# Patient Record
Sex: FEMALE | Race: OTHER | HISPANIC OR LATINO | URBAN - METROPOLITAN AREA
[De-identification: names, ages, dates, MRNs, and addresses within clinical notes are randomized per-mention and may not be internally consistent; named-entity substitution may affect disease eponyms.]

---

## 2019-01-15 ENCOUNTER — EMERGENCY (EMERGENCY)
Facility: HOSPITAL | Age: 69
LOS: 1 days | Discharge: HOME | End: 2019-01-15
Attending: STUDENT IN AN ORGANIZED HEALTH CARE EDUCATION/TRAINING PROGRAM
Payer: MEDICARE

## 2019-01-15 VITALS
SYSTOLIC BLOOD PRESSURE: 152 MMHG | DIASTOLIC BLOOD PRESSURE: 88 MMHG | OXYGEN SATURATION: 98 % | HEART RATE: 96 BPM | TEMPERATURE: 99 F | RESPIRATION RATE: 20 BRPM | HEIGHT: 63 IN | WEIGHT: 154.1 LBS

## 2019-01-15 LAB
ALBUMIN SERPL ELPH-MCNC: 4.2 G/DL — SIGNIFICANT CHANGE UP (ref 3.3–5.2)
ALP SERPL-CCNC: 78 U/L — SIGNIFICANT CHANGE UP (ref 40–120)
ALT FLD-CCNC: 16 U/L — SIGNIFICANT CHANGE UP
ANION GAP SERPL CALC-SCNC: 10 MMOL/L — SIGNIFICANT CHANGE UP (ref 5–17)
AST SERPL-CCNC: 20 U/L — SIGNIFICANT CHANGE UP
BASOPHILS # BLD AUTO: 0 K/UL — SIGNIFICANT CHANGE UP (ref 0–0.2)
BASOPHILS NFR BLD AUTO: 0.4 % — SIGNIFICANT CHANGE UP (ref 0–2)
BILIRUB SERPL-MCNC: <0.2 MG/DL — LOW (ref 0.4–2)
BUN SERPL-MCNC: 13 MG/DL — SIGNIFICANT CHANGE UP (ref 8–20)
CALCIUM SERPL-MCNC: 9.2 MG/DL — SIGNIFICANT CHANGE UP (ref 8.6–10.2)
CHLORIDE SERPL-SCNC: 104 MMOL/L — SIGNIFICANT CHANGE UP (ref 98–107)
CO2 SERPL-SCNC: 27 MMOL/L — SIGNIFICANT CHANGE UP (ref 22–29)
CREAT SERPL-MCNC: 0.83 MG/DL — SIGNIFICANT CHANGE UP (ref 0.5–1.3)
EOSINOPHIL # BLD AUTO: 0.8 K/UL — HIGH (ref 0–0.5)
EOSINOPHIL NFR BLD AUTO: 9.5 % — HIGH (ref 0–6)
GLUCOSE SERPL-MCNC: 113 MG/DL — SIGNIFICANT CHANGE UP (ref 70–115)
HCT VFR BLD CALC: 37.9 % — SIGNIFICANT CHANGE UP (ref 37–47)
HGB BLD-MCNC: 12.4 G/DL — SIGNIFICANT CHANGE UP (ref 12–16)
LYMPHOCYTES # BLD AUTO: 3.2 K/UL — SIGNIFICANT CHANGE UP (ref 1–4.8)
LYMPHOCYTES # BLD AUTO: 40.9 % — SIGNIFICANT CHANGE UP (ref 20–55)
MCHC RBC-ENTMCNC: 29.5 PG — SIGNIFICANT CHANGE UP (ref 27–31)
MCHC RBC-ENTMCNC: 32.7 G/DL — SIGNIFICANT CHANGE UP (ref 32–36)
MCV RBC AUTO: 90 FL — SIGNIFICANT CHANGE UP (ref 81–99)
MONOCYTES # BLD AUTO: 0.7 K/UL — SIGNIFICANT CHANGE UP (ref 0–0.8)
MONOCYTES NFR BLD AUTO: 9.3 % — SIGNIFICANT CHANGE UP (ref 3–10)
NEUTROPHILS # BLD AUTO: 3.1 K/UL — SIGNIFICANT CHANGE UP (ref 1.8–8)
NEUTROPHILS NFR BLD AUTO: 39.6 % — SIGNIFICANT CHANGE UP (ref 37–73)
NT-PROBNP SERPL-SCNC: 12 PG/ML — SIGNIFICANT CHANGE UP (ref 0–300)
PLATELET # BLD AUTO: 342 K/UL — SIGNIFICANT CHANGE UP (ref 150–400)
POTASSIUM SERPL-MCNC: 4.6 MMOL/L — SIGNIFICANT CHANGE UP (ref 3.5–5.3)
POTASSIUM SERPL-SCNC: 4.6 MMOL/L — SIGNIFICANT CHANGE UP (ref 3.5–5.3)
PROT SERPL-MCNC: 7.9 G/DL — SIGNIFICANT CHANGE UP (ref 6.6–8.7)
RBC # BLD: 4.21 M/UL — LOW (ref 4.4–5.2)
RBC # FLD: 12.6 % — SIGNIFICANT CHANGE UP (ref 11–15.6)
SODIUM SERPL-SCNC: 141 MMOL/L — SIGNIFICANT CHANGE UP (ref 135–145)
TROPONIN T SERPL-MCNC: <0.01 NG/ML — SIGNIFICANT CHANGE UP (ref 0–0.06)
WBC # BLD: 7.9 K/UL — SIGNIFICANT CHANGE UP (ref 4.8–10.8)
WBC # FLD AUTO: 7.9 K/UL — SIGNIFICANT CHANGE UP (ref 4.8–10.8)

## 2019-01-15 PROCEDURE — 99285 EMERGENCY DEPT VISIT HI MDM: CPT

## 2019-01-15 PROCEDURE — 87486 CHLMYD PNEUM DNA AMP PROBE: CPT

## 2019-01-15 PROCEDURE — 93005 ELECTROCARDIOGRAM TRACING: CPT

## 2019-01-15 PROCEDURE — 87633 RESP VIRUS 12-25 TARGETS: CPT

## 2019-01-15 PROCEDURE — 83880 ASSAY OF NATRIURETIC PEPTIDE: CPT

## 2019-01-15 PROCEDURE — 80053 COMPREHEN METABOLIC PANEL: CPT

## 2019-01-15 PROCEDURE — 71045 X-RAY EXAM CHEST 1 VIEW: CPT

## 2019-01-15 PROCEDURE — 96375 TX/PRO/DX INJ NEW DRUG ADDON: CPT

## 2019-01-15 PROCEDURE — T1013: CPT

## 2019-01-15 PROCEDURE — 84484 ASSAY OF TROPONIN QUANT: CPT

## 2019-01-15 PROCEDURE — 96374 THER/PROPH/DIAG INJ IV PUSH: CPT

## 2019-01-15 PROCEDURE — 99285 EMERGENCY DEPT VISIT HI MDM: CPT | Mod: 25

## 2019-01-15 PROCEDURE — 94640 AIRWAY INHALATION TREATMENT: CPT

## 2019-01-15 PROCEDURE — 36415 COLL VENOUS BLD VENIPUNCTURE: CPT

## 2019-01-15 PROCEDURE — 87798 DETECT AGENT NOS DNA AMP: CPT

## 2019-01-15 PROCEDURE — 93010 ELECTROCARDIOGRAM REPORT: CPT

## 2019-01-15 PROCEDURE — 85027 COMPLETE CBC AUTOMATED: CPT

## 2019-01-15 PROCEDURE — 71045 X-RAY EXAM CHEST 1 VIEW: CPT | Mod: 26

## 2019-01-15 PROCEDURE — 87581 M.PNEUMON DNA AMP PROBE: CPT

## 2019-01-15 RX ORDER — IPRATROPIUM/ALBUTEROL SULFATE 18-103MCG
3 AEROSOL WITH ADAPTER (GRAM) INHALATION ONCE
Qty: 0 | Refills: 0 | Status: COMPLETED | OUTPATIENT
Start: 2019-01-15 | End: 2019-01-15

## 2019-01-15 RX ORDER — ALBUTEROL 90 UG/1
2.5 AEROSOL, METERED ORAL ONCE
Qty: 0 | Refills: 0 | Status: COMPLETED | OUTPATIENT
Start: 2019-01-15 | End: 2019-01-15

## 2019-01-15 RX ORDER — MAGNESIUM SULFATE 500 MG/ML
2 VIAL (ML) INJECTION ONCE
Qty: 0 | Refills: 0 | Status: COMPLETED | OUTPATIENT
Start: 2019-01-15 | End: 2019-01-15

## 2019-01-15 RX ADMIN — ALBUTEROL 2.5 MILLIGRAM(S): 90 AEROSOL, METERED ORAL at 23:20

## 2019-01-15 RX ADMIN — Medication 3 MILLILITER(S): at 20:22

## 2019-01-15 RX ADMIN — Medication 50 GRAM(S): at 23:20

## 2019-01-15 RX ADMIN — Medication 125 MILLIGRAM(S): at 20:22

## 2019-01-15 RX ADMIN — Medication 3 MILLILITER(S): at 23:20

## 2019-01-15 RX ADMIN — Medication 3 MILLILITER(S): at 21:42

## 2019-01-15 NOTE — ED ADULT TRIAGE NOTE - CHIEF COMPLAINT QUOTE
visiting from [place] and told URI. Now getting worse. No cardiac History. Wheezing and rhonchi now. Chest Pressure also

## 2019-01-15 NOTE — ED PROVIDER NOTE - MEDICAL DECISION MAKING DETAILS
Patient with bronchospasm due to subacute bronchitis. Will treat with bronchodilators and check for pneumonia.

## 2019-01-15 NOTE — ED PROVIDER NOTE - MUSCULOSKELETAL, MLM
Spine appears normal, range of motion is not limited, no muscle or joint tenderness. No calf pain. Spine appears normal, range of motion is not limited, no muscle or joint tenderness. No calf tenderness

## 2019-01-15 NOTE — ED PROVIDER NOTE - OBJECTIVE STATEMENT
67 y/o F with hx of htn presenting to the ED c/o worsening SOB. Pt notes persistent nonproductive cough for a week. Patient states when she was in [place] had a mild cough Pt states she is on vacation from [place] where she saw her pcp who informed her she had slight bronchitis and gave her an inhaler which now does not relieve symptoms. When she cough, cp, cant sleep, fever since 3 days ago. Denies chills, vomiting, diarrhea, leg pain. Able to take PO intake. Former smoker (40 years ago). 69 y/o F with PMHx of HTN presents to the ED c/o worsening SOB onset 2 weeks, worsening over the past week. Pt notes having a persistent nonproductive cough for a week. Associated subjective fevers that began 3 days ago. Patient is visiting from [place], but states at onset of her symptoms 2 weeks ago, she was evaluated by her PCP, who diagnosed her with "slight bronchitis" and gave her an inhaler which helped at first, but now does not relieve symptoms. States that when she coughs, she experiences chest pain and can't sleep. Denies chills, vomiting, diarrhea, leg pain. Able to take PO intake. Former smoker (40 years ago). No further acute complaints at this time.    : Ludwig 69 y/o F with PMHx of HTN presents to the ED c/o worsening SOB onset 2 weeks, worsening over the past week. Pt notes having a persistent nonproductive cough for a week. Associated subjective fevers that began 3 days ago. Patient is visiting from [place], but states at onset of her symptoms 2 weeks ago, she was evaluated by her PCP, who diagnosed her with "slight bronchitis" and gave her an inhaler which helped at first, but now does not relieve symptoms. States that when she coughs, she experiences chest discomfort and can't sleep. Denies chills, vomiting, diarrhea, leg pain. Able to take PO intake. Former smoker (40 years ago). No further acute complaints at this time.    : Ludwig

## 2019-01-15 NOTE — ED PROVIDER NOTE - PROGRESS NOTE DETAILS
patient is still wheezing diffusely, however, pulse ox is improved. Patient is well appearing, feels significantly better. Plan is to give another bronchodilator with magnesium infusion and re-evaluate. Patient with resolved wheezing. Continues to feel well. Patient to follow-up with [facility] center or return to the ED since she is not from the area.

## 2019-01-15 NOTE — ED PROVIDER NOTE - GASTROINTESTINAL NEGATIVE STATEMENT, MLM
no abdominal pain, no bloating, no constipation, no diarrhea, no nausea and no vomiting. no diarrhea, no nausea and no vomiting. no diarrhea and no vomiting.

## 2019-01-15 NOTE — ED PROVIDER NOTE - CARDIAC, MLM
Normal rate, regular rhythm.  Heart sounds S1, S2.  No murmurs, rubs or gallops. Normal rate, regular rhythm.  Heart sounds S1, S2.  No murmurs, rubs or gallops. No LE edema

## 2019-01-15 NOTE — ED STATDOCS - PROGRESS NOTE DETAILS
Patient is a 68 year old F with a PMHx of HTN who presents to the ED with family complaining of worsening shortness of breath, cough and chest pressure.  Patient is visiting from [place].  Patient was [facility] in [place] [facility] for same symptoms and was diagnosed with bronchitis.  States she was prescribed an inhaler, which has stopped working for her symptoms.  Patient is not a smoker. Notes a fever of 101 over the weekend.   Patient has no cardiac history.  Patient with audible wheezing and rhonchi on exam.  Will be sent to main ED for further evaluation.

## 2019-01-16 VITALS
TEMPERATURE: 99 F | SYSTOLIC BLOOD PRESSURE: 136 MMHG | RESPIRATION RATE: 20 BRPM | DIASTOLIC BLOOD PRESSURE: 77 MMHG | HEART RATE: 117 BPM | OXYGEN SATURATION: 97 %

## 2019-01-16 LAB — RAPID RVP RESULT: SIGNIFICANT CHANGE UP

## 2019-01-16 RX ORDER — ALBUTEROL 90 UG/1
2 AEROSOL, METERED ORAL
Qty: 1 | Refills: 0 | OUTPATIENT
Start: 2019-01-16 | End: 2019-02-14

## 2019-01-16 NOTE — ED ADULT NURSE REASSESSMENT NOTE - NS ED NURSE REASSESS COMMENT FT1
Pt. respirations even and unlabored, wheeze absent, states she feels "much better" and wishes to go home

## 2022-10-12 NOTE — ED ADULT NURSE NOTE - NSFALLRSKHARMRISK_ED_ALL_ED

## 2024-04-06 NOTE — ED ADULT NURSE NOTE - TEMPLATE
89 y/o female with PMH of TIA, Afib on eliquis, dementia, UTI who presented to ED to be evaluated for AMS. pt lives home with her family and gets around by means of walker. about 20 days ago pt noted to have visual hallucination. per daughter pt saw little girl in room. pt was taken to her PCP as UTI had led to such symptoms in past. pt was placed on 10 days of keflex with improvement. pt had F/u appt with her PCP about 1 week ago where everything including urine cx was reported to be normal. in last 2 days pt became more confused. trying to get out of bed at night on her own and starts walking w/o walker. per daughter pt have been more weak and lethargy during day time. pt has home health visiting 6 days/week only during daytime. daughter thinks that pt is still having UTI and was BIBA.  pt seems to ne pleasantly confused upon my initial evaluation. currently A and O X 3. pt does not recal any visual or auditory hallucination pt is not sure why she is here  denies any abdominal pain, nausea,vomiting, fever,chills or any urinary symptoms Respiratory

## 2024-07-23 NOTE — ED ADULT TRIAGE NOTE - LOCATION:
Please let Mr. Sun know that his test was abnormal and please make them an appointment in 2 WEEKS if not already done. Thanks.    Angy Right arm;